# Patient Record
Sex: MALE | Race: WHITE | Employment: STUDENT | URBAN - METROPOLITAN AREA
[De-identification: names, ages, dates, MRNs, and addresses within clinical notes are randomized per-mention and may not be internally consistent; named-entity substitution may affect disease eponyms.]

---
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
## 2022-08-20 ENCOUNTER — TELEPHONE (OUTPATIENT)
Dept: INTERNAL MEDICINE CLINIC | Facility: CLINIC | Age: 22
End: 2022-08-20

## 2022-08-20 DIAGNOSIS — R23.4 SCAB: ICD-10-CM

## 2022-09-06 NOTE — TELEPHONE ENCOUNTER
To MD - pt response to question why he needs;   Does not seem appropriate    I am getting a lot of scabs from mosquito bites from this summer and I have some scabs in my nose too

## 2024-02-23 DIAGNOSIS — R23.4 SCAB: ICD-10-CM

## 2024-02-24 RX ORDER — NAPROXEN 500 MG/1
500 TABLET ORAL 2 TIMES DAILY WITH MEALS
Qty: 60 TABLET | Refills: 0 | Status: CANCELLED | OUTPATIENT
Start: 2024-02-24

## 2024-02-26 NOTE — TELEPHONE ENCOUNTER
Pt was sent mychart. He has not been seen since 2021.     Current refill request refused due to refill is either a duplicate request or has active refills at the pharmacy.  Check previous templates.    Requested Prescriptions     Pending Prescriptions Disp Refills    mupirocin 2 % External Ointment 22 g 2     Sig: Apply 1 Application topically 2 (two) times daily as needed.

## 2024-02-26 NOTE — TELEPHONE ENCOUNTER
Not seen since 2021. Needs to be seen. He was reminded 8/2022.   Current refill request refused due to refill is either a duplicate request or has active refills at the pharmacy.  Check previous templates.    Requested Prescriptions     Refused Prescriptions Disp Refills    naproxen 500 MG Oral Tab 60 tablet 0     Sig: Take 1 tablet (500 mg total) by mouth 2 (two) times daily with meals.

## 2024-03-26 ENCOUNTER — OFFICE VISIT (OUTPATIENT)
Dept: INTERNAL MEDICINE CLINIC | Facility: CLINIC | Age: 24
End: 2024-03-26
Payer: COMMERCIAL

## 2024-03-26 VITALS
BODY MASS INDEX: 25.99 KG/M2 | SYSTOLIC BLOOD PRESSURE: 126 MMHG | TEMPERATURE: 98 F | HEIGHT: 65 IN | DIASTOLIC BLOOD PRESSURE: 78 MMHG | HEART RATE: 100 BPM | OXYGEN SATURATION: 69 % | WEIGHT: 156 LBS

## 2024-03-26 DIAGNOSIS — S76.011A STRAIN OF FLEXOR MUSCLE OF RIGHT HIP, INITIAL ENCOUNTER: ICD-10-CM

## 2024-03-26 DIAGNOSIS — M25.551 RIGHT HIP PAIN: ICD-10-CM

## 2024-03-26 DIAGNOSIS — F41.1 GAD (GENERALIZED ANXIETY DISORDER): ICD-10-CM

## 2024-03-26 DIAGNOSIS — F33.1 MDD (MAJOR DEPRESSIVE DISORDER), RECURRENT EPISODE, MODERATE (HCC): ICD-10-CM

## 2024-03-26 DIAGNOSIS — Z00.00 PHYSICAL EXAM: Primary | ICD-10-CM

## 2024-03-26 DIAGNOSIS — F90.0 ATTENTION DEFICIT HYPERACTIVITY DISORDER (ADHD), PREDOMINANTLY INATTENTIVE TYPE: ICD-10-CM

## 2024-03-26 DIAGNOSIS — F84.0 AUTISM SPECTRUM DISORDER (HCC): ICD-10-CM

## 2024-03-26 DIAGNOSIS — F41.9 ANXIETY: ICD-10-CM

## 2024-03-26 DIAGNOSIS — F12.90 CANNABIS USE DISORDER: ICD-10-CM

## 2024-03-26 PROCEDURE — 3074F SYST BP LT 130 MM HG: CPT | Performed by: INTERNAL MEDICINE

## 2024-03-26 PROCEDURE — 3078F DIAST BP <80 MM HG: CPT | Performed by: INTERNAL MEDICINE

## 2024-03-26 PROCEDURE — 3008F BODY MASS INDEX DOCD: CPT | Performed by: INTERNAL MEDICINE

## 2024-03-26 PROCEDURE — 99385 PREV VISIT NEW AGE 18-39: CPT | Performed by: INTERNAL MEDICINE

## 2024-03-26 RX ORDER — EPINEPHRINE 0.3 MG/.3ML
0.3 INJECTION SUBCUTANEOUS AS NEEDED
COMMUNITY
Start: 2024-03-13

## 2024-03-26 NOTE — PATIENT INSTRUCTIONS
1. Physical exam  Physical exam instruction: Improve diet and exercise, complete fasting labs in the near future and you will be called with results 5-7 days after completed, call with questions.  Call the central scheduling number at [phone] to schedule at any of the [facility] locations    - TSH W Reflex To Free T4; Future  - Urinalysis with Culture Reflex  - Vitamin D; Future  - CBC With Differential With Platelet; Future  - Comp Metabolic Panel (14); Future  - Lipid Panel; Future    2. Right hip pain  I do think you are dealing with a hip flexor problem or strain, I like the idea of you keeping the quad muscle and hip flexors loose multiple times through the day in the morning and at night  If you want to use some anti-inflammatories 600 mg ibuprofen up to 3 times a day with food would certainly help, after you are done running, you should be icing this area after exercising whether it hurts or not, this will keep the inflammation down  If you are still having trouble with it we can engage physical therapy, but you would have to call me or send me a [employer] message if we think we need this  Feel free to look on YouTube for things that would help with hip flexor strain  - XR HIP W OR WO PELVIS 2 OR 3 VIEWS, RIGHT (CPT=73502); Future    3. MDD (major depressive disorder), recurrent episode, moderate (HCC)  Stable continue current monitor management follow-up with specialist    4. SIDRA (generalized anxiety disorder)  Stable continue current monitor management follow-up with specialist    5. Autism spectrum disorder (HCC)  Stable continue current monitor management follow-up with specialist    6. Anxiety  Stable continue current monitor management follow-up with specialist    7. Attention deficit hyperactivity disorder (ADHD), predominantly inattentive type  Stable continue current monitor management follow-up with specialist    8. Cannabis use disorder  Stable continue current monitor management follow-up with  specialist    9. Strain of flexor muscle of right hip, initial encounter  Stable continue current monitor management follow-up with specialist  - XR HIP W OR WO PELVIS 2 OR 3 VIEWS, RIGHT (CPT=73502); Future

## 2024-03-26 NOTE — PROGRESS NOTES
Lamont Ha is a 23 year old male who presents for a complete physical exam.   HPI:   Pt complains of:    Chief Complaint   Patient presents with    Physical     Right hip pain 5/10.  Running for exercise and his hip has been bothering him for 1 months.  Diet has been following high protein.  Weight been stable    Hip Pain     As above with hip pain, bothers him with stairs.    Patient is here today for physical exam, patient is up-to-date with age-related standard of care screening and vaccination recommendations, this was discussed at length and they verbalized understanding of any deficiencies.  Has refused COVID and flu shots, long discussion had.  Hip pain: Patient with short history of hip pain, 1 month, has been bothering him in the hip flexor area, he has been running, stretching mildly, and it continues to be a problem.  It does not limit him from doing physical activities, currently working at [employer], and stable doing well    Wt Readings from Last 3 Encounters:   03/26/24 156 lb (70.8 kg)   02/11/21 150 lb (68 kg) (48%, Z= -0.05)*   02/02/21 152 lb (68.9 kg) (51%, Z= 0.03)*     * Growth percentiles are based on CDC (Boys, 2-20 Years) data.       Current Outpatient Medications   Medication Sig Dispense Refill    EPINEPHrine 0.3 MG/0.3ML Injection Solution Auto-injector Inject 0.3 mL (1 each total) into the muscle as needed. FOR SUSPECTED ANAPHYLAXIS, ADMINISTER A SINGLE AUTO INJECTOR AT A 90 DEGREE ANGLE INTO THE FRONT OR OUTER SIDE OF THE THIGH AND HOLD FOR 3 SECONDS. SEEK EMERGENCY MEDICAL CARE. MAY REPEAT AFTER 5 MINUTES IF THERE IS NO CHANGE IN STATUS OR IF THE SYMPTOMS RETURN      Amphetamine-Dextroamphet ER 20 MG Oral Capsule SR 24 Hr Take 1 capsule (20 mg total) by mouth daily. 30 capsule 0    escitalopram 10 MG Oral Tab Take 1.5 tablets (15 mg total) by mouth nightly. 45 tablet 2    QUEtiapine 100 MG Oral Tab Take 1 tablet by mouth nightly. (Take with 25mg tablet for a total of 125mg  nightly.) 30 tablet 2    QUEtiapine 25 MG Oral Tab Take 1 tablet by mouth nightly alongside a 100 mg tablet for a total of 125mg nightly. 30 tablet 2    mupirocin 2 % External Ointment Apply 1 Application topically 2 (two) times daily as needed. 22 g 2      Past Medical History:   Diagnosis Date    ADHD (attention deficit hyperactivity disorder)     Anxiety     Constipation     COVID-19     Depression       Past Surgical History:   Procedure Laterality Date    OTHER  9 mo old    birth oliverio removal from buttocks       Family History   Problem Relation Age of Onset    No Known Problems Mother     No Known Problems Father     No Known Problems Sister     No Known Problems Sister     No Known Problems Brother     Heart Disease Paternal Grandmother         MI    High Blood Pressure Paternal Grandfather     Heart Disease Paternal Grandfather         MI    Cancer Paternal Grandfather         Lung, Colon     No Known Problems Maternal Grandmother     No Known Problems Maternal Grandfather     Suicide History Maternal Cousin         completed    Suicide History Paternal Cousin         attempt      Social History:  Social History     Socioeconomic History    Marital status: Single   Tobacco Use    Smoking status: Never    Smokeless tobacco: Never   Vaping Use    Vaping Use: Never used   Substance and Sexual Activity    Alcohol use: Not Currently     Comment: occasional    Drug use: Yes     Frequency: 5.0 times per week     Types: Cannabis         REVIEW OF SYSTEMS:   REVIEW OF SYSTEMS:  Constitutional: Negative for Chills, fatigue, fever, malaise, weight gain and weight loss.  ENMT: Negative for Nasal drainage and sinus pressure.  Eyes: Negative for Vision changes.  Respiratory: Negative for Cough, dyspnea and wheezing.  Cardio: Negative Chest pain and irregular heartbeat/palpitations.  GI: Negative for Abdominal pain, constipation, diarrhea, heartburn, nausea and vomiting.  : Negative for Dysuria and urinary  frequency.  Endocrine: Negative for Cold intolerance and heat intolerance.  Neuro: Negative for Gait disturbance and memory impairment.  Psych: Negative for Anxiety and depression.  Integumentary: Negative for Hives and rash.  MS: Negative muscle weakness. negative for joint pain  Hema/Lymph: Negative Easy bleeding and easy bruising.  Allergic/Immuno: Negative Environmental allergies and food allergies.    EXAM:   /78   Pulse 100   Temp 97.5 °F (36.4 °C)   Ht 5' 5\" (1.651 m)   Wt 156 lb (70.8 kg)   SpO2 (!) 69%   BMI 25.96 kg/m²   Body mass index is 25.96 kg/m².   PHYSICAL EXAMINATION:  Vital signs: See chart   Gen. exam: Alert and oriented, in no acute distress   HEENT: Pupils equal and reactive to light and accommodation, moist mucous membranes  Neck exam:  Supple with baseline range of motion.  Normal thyroid trachea midline, no JVD  Heart exam: Regular rate and rhythm no murmurs no S3 no S4   Lung exam: No rales no rhonchi no wheezes  Abdominal exam: Soft nontender nondistended positive bowel sounds are normoactive  Extremities exam: no clubbing no cyanosis no edema  Skin exam: No obvious wounds, no rashes  Neurological exam: Cranial nerves II through XII intact, no gross deficits  Musculoskeletal exam: no Arthritis appreciated, no obvious deformity, right hip exam: With good glide, good range of motion, mild pain with hip flexion on the iliac crest month, mild pain to palpation on the iliac crest.  : prostate not examined    ASSESSMENT AND PLAN:   Lamont Ha is a 23 year old male who presents for a complete physical exam.  1. Physical exam  Physical exam instruction: Improve diet and exercise, complete fasting labs in the near future and you will be called with results 5-7 days after completed, call with questions.  Call the central scheduling number at [phone] to schedule at any of the [facility] locations    - TSH W Reflex To Free T4; Future  - Urinalysis with Culture Reflex  -  Vitamin D; Future  - CBC With Differential With Platelet; Future  - Comp Metabolic Panel (14); Future  - Lipid Panel; Future    2. Right hip pain  I do think you are dealing with a hip flexor problem or strain, I like the idea of you keeping the quad muscle and hip flexors loose multiple times through the day in the morning and at night  If you want to use some anti-inflammatories 600 mg ibuprofen up to 3 times a day with food would certainly help, after you are done running, you should be icing this area after exercising whether it hurts or not, this will keep the inflammation down  If you are still having trouble with it we can engage physical therapy, but you would have to call me or send me a [employer] message if we think we need this  Feel free to look on YouTube for things that would help with hip flexor strain  - XR HIP W OR WO PELVIS 2 OR 3 VIEWS, RIGHT (CPT=73832); Future    3. MDD (major depressive disorder), recurrent episode, moderate (HCC)  Stable continue current monitor management follow-up with specialist    4. SIDRA (generalized anxiety disorder)  Stable continue current monitor management follow-up with specialist    5. Autism spectrum disorder (HCC)  Stable continue current monitor management follow-up with specialist    6. Anxiety  Stable continue current monitor management follow-up with specialist    7. Attention deficit hyperactivity disorder (ADHD), predominantly inattentive type  Stable continue current monitor management follow-up with specialist    8. Cannabis use disorder  Stable continue current monitor management follow-up with specialist    9. Strain of flexor muscle of right hip, initial encounter  Stable continue current monitor management follow-up with specialist  - XR HIP W OR WO PELVIS 2 OR 3 VIEWS, RIGHT (CPT=73652); Future        Spent 45 minutes obtaining history, evaluating patient, discussing treatment options, diet, exercise, review of available labs and radiology reports, and  completing documentation.    Jeff Anthony D'Amico, DO  3/26/2024  10:10 AM

## 2025-03-15 ENCOUNTER — HOSPITAL ENCOUNTER (OUTPATIENT)
Age: 25
Discharge: HOME | End: 2025-03-15
Payer: COMMERCIAL

## 2025-03-15 ENCOUNTER — APPOINTMENT (OUTPATIENT)
Dept: GENERAL RADIOLOGY | Age: 25
End: 2025-03-15
Attending: EMERGENCY MEDICINE
Payer: COMMERCIAL

## 2025-03-15 VITALS
HEIGHT: 65 IN | OXYGEN SATURATION: 97 % | HEART RATE: 74 BPM | DIASTOLIC BLOOD PRESSURE: 84 MMHG | TEMPERATURE: 97 F | BODY MASS INDEX: 26.66 KG/M2 | SYSTOLIC BLOOD PRESSURE: 150 MMHG | WEIGHT: 160 LBS | RESPIRATION RATE: 18 BRPM

## 2025-03-15 DIAGNOSIS — S93.601A FOOT SPRAIN, RIGHT, INITIAL ENCOUNTER: Primary | ICD-10-CM

## 2025-03-15 PROCEDURE — 73630 X-RAY EXAM OF FOOT: CPT | Performed by: EMERGENCY MEDICINE

## 2025-03-15 PROCEDURE — 99204 OFFICE O/P NEW MOD 45 MIN: CPT

## 2025-03-15 PROCEDURE — 99213 OFFICE O/P EST LOW 20 MIN: CPT

## 2025-03-15 NOTE — DISCHARGE INSTRUCTIONS
Returning for right foot sprain.  Podiatry follow-up if not better in the next week.   Cool compress 20 minutes on, 20 minutes off.  Ibuprofen every 6-8 hours for the next few days, and make sure that you take it with food.  Coban was given here at the urgent care you can apply your Ace wrap at home.  The Coban can be used.  You can buy more at your local [employer], [employer],or online at [employer].

## 2025-03-15 NOTE — ED INITIAL ASSESSMENT (HPI)
Patient injured right foot last night when he tried to kick a bin out of his way. C/o pain dorsum of foot proximal to great toe.

## 2025-03-15 NOTE — ED PROVIDER NOTES
Patient Seen in: [facility] [place]      History     Chief Complaint   Patient presents with    Leg or Foot Injury     Entered by patient     Stated Complaint: Leg or Foot Injury    Subjective:   HPI  Lamont aH is a 24 year old male here for left foot injury that happened 24 hours ago.  Able to bear weight, but still has pain with certain range of motion.  No wounds, skin color changes, or acute distress at this time.        Objective:     Past Medical History:    ADHD (attention deficit hyperactivity disorder)    Anxiety    Constipation    COVID-19    Depression              Past Surgical History:   Procedure Laterality Date    Other  9 mo old    birth oliverio removal from buttocks                 Social History     Socioeconomic History    Marital status: Single   Tobacco Use    Smoking status: Never    Smokeless tobacco: Never   Vaping Use    Vaping status: Never Used   Substance and Sexual Activity    Alcohol use: Not Currently     Comment: occasional    Drug use: Yes     Frequency: 5.0 times per week     Types: Cannabis              Review of Systems    Positive for stated complaint: Leg or Foot Injury  Other systems are as noted in HPI.  Constitutional and vital signs reviewed.      All other systems reviewed and negative except as noted above.    Physical Exam     ED Triage Vitals [03/15/25 1031]   /84   Pulse 74   Resp 18   Temp 97 °F (36.1 °C)   Temp src Oral   SpO2 97 %   O2 Device None (Room air)       Current Vitals:   Vital Signs  BP: 150/84  Pulse: 74  Resp: 18  Temp: 97 °F (36.1 °C)  Temp src: Oral    Oxygen Therapy  SpO2: 97 %  O2 Device: None (Room air)        Physical Exam  Vitals and nursing note reviewed.   Constitutional:       Appearance: Normal appearance.   HENT:      Head: Normocephalic.   Cardiovascular:      Rate and Rhythm: Normal rate.      Pulses: Normal pulses.   Musculoskeletal:      Cervical back: Normal range of motion.      Comments: Decreased range of motion  to left midfoot.  Tenderness near medial malleolus.  Tenderness near first metatarsal.  No signs of instability.  Compartments soft, NVI, and pulses present.   Skin:     Capillary Refill: Capillary refill takes less than 2 seconds.      Findings: No erythema or rash.   Neurological:      General: No focal deficit present.      Mental Status: He is alert.   Psychiatric:         Mood and Affect: Mood normal.         Behavior: Behavior normal.         Thought Content: Thought content normal.         Judgment: Judgment normal.             ED Course   Labs Reviewed - No data to display                MDM           Medical Decision Making  Fx vs Sprain vs other causes of sx. No open wounds.     Tx for left foot sprain.  Coban wrapped at the urgent care.  NSAIDS, rest, ice elevate.  Podiatry follow-up if symptoms not better within the next 7 to 10 days.  Symptoms better after Coban applied.  Weightbearing.  Crutches not indicated at this time.  No e/o compartment syndrome, arterial injury, or nerve injury at this time.  NVI, and good pulses.     Education: proper ergonomics, safety, protection, and re-injury prevention.  Modified activity discussed.    I Updated patient  on all findings, who verbalized understanding and agreement with the plan. They are aware to go to the ER for new or worsening sx. PCP f/u as discussed.  All questions answered. No acute distress and cleared for home.     Problems Addressed:  Foot sprain, right, initial encounter: acute illness or injury    Amount and/or Complexity of Data Reviewed  External Data Reviewed: notes.  Radiology: ordered and independent interpretation performed. Decision-making details documented in ED Course.     Details: After independent interpretation I agree with radiologist No acute fracture    XR FOOT, COMPLETE (MIN 3 VIEWS), RIGHT (CPT=73630)  Result Date: 3/15/2025  CONCLUSION:  1. No acute fracture or subluxation.    Dictated by (CST): Elmer Hay MD on 3/15/2025  at 11:08 AM     Finalized by (CST): Elmer Hay MD on 3/15/2025 at 11:09 AM                  Disposition and Plan     Clinical Impression:  1. Foot sprain, right, initial encounter         Disposition:  Discharge  3/15/2025 11:41 am    Follow-up:  D'Amico, Jeff Anthony, DO  [address] [facility] [id number]  [phone]          Lisa Engel, DPM  [address],  [place] [address]  [phone]    Schedule an appointment as soon as possible for a visit in 1 week  follow up., if you are not better.          Medications Prescribed:  Discharge Medication List as of 3/15/2025 11:41 AM              Supplementary Documentation:

## (undated) NOTE — LETTER
Date & Time: 3/15/2025, 11:41 AM  Patient: Lamont Ha  Encounter Provider(s):    Judy Vega APRN       To Whom It May Concern:    Lamont Ha was seen and treated in our department on 3/15/2025.  Please excuse from work Monday, 3/17/2025.    CHEVY Denton, 03/15/25, 11:41 AM